# Patient Record
Sex: FEMALE | Race: WHITE | NOT HISPANIC OR LATINO | Employment: OTHER | URBAN - METROPOLITAN AREA
[De-identification: names, ages, dates, MRNs, and addresses within clinical notes are randomized per-mention and may not be internally consistent; named-entity substitution may affect disease eponyms.]

---

## 2019-06-03 ENCOUNTER — HOSPITAL ENCOUNTER (EMERGENCY)
Facility: HOSPITAL | Age: 74
Discharge: HOME | End: 2019-06-03
Attending: EMERGENCY MEDICINE
Payer: MEDICARE

## 2019-06-03 VITALS
TEMPERATURE: 98 F | WEIGHT: 130 LBS | RESPIRATION RATE: 20 BRPM | BODY MASS INDEX: 22.2 KG/M2 | HEART RATE: 63 BPM | DIASTOLIC BLOOD PRESSURE: 90 MMHG | HEIGHT: 64 IN | OXYGEN SATURATION: 99 % | SYSTOLIC BLOOD PRESSURE: 145 MMHG

## 2019-06-03 DIAGNOSIS — T14.90XA INJURY: ICD-10-CM

## 2019-06-03 DIAGNOSIS — S93.421A SPRAIN OF DELTOID LIGAMENT OF RIGHT ANKLE, INITIAL ENCOUNTER: ICD-10-CM

## 2019-06-03 DIAGNOSIS — H11.32 SUBCONJUNCTIVAL HEMORRHAGE OF LEFT EYE: ICD-10-CM

## 2019-06-03 DIAGNOSIS — T07.XXXA MULTIPLE CONTUSIONS: Primary | ICD-10-CM

## 2019-06-03 PROCEDURE — 73630 X-RAY EXAM OF FOOT: CPT | Mod: 26,RT,, | Performed by: RADIOLOGY

## 2019-06-03 PROCEDURE — 70450 CT HEAD WITHOUT CONTRAST: ICD-10-PCS | Mod: 26,,, | Performed by: RADIOLOGY

## 2019-06-03 PROCEDURE — 73110 XR WRIST COMPLETE 3 VIEWS LEFT: ICD-10-PCS | Mod: 26,LT,, | Performed by: RADIOLOGY

## 2019-06-03 PROCEDURE — 71046 X-RAY EXAM CHEST 2 VIEWS: CPT | Mod: 26,,, | Performed by: RADIOLOGY

## 2019-06-03 PROCEDURE — 73130 X-RAY EXAM OF HAND: CPT | Mod: 26,LT,, | Performed by: RADIOLOGY

## 2019-06-03 PROCEDURE — 73110 X-RAY EXAM OF WRIST: CPT | Mod: TC,FY,LT

## 2019-06-03 PROCEDURE — 73630 XR FOOT COMPLETE 3 VIEW RIGHT: ICD-10-PCS | Mod: 26,RT,, | Performed by: RADIOLOGY

## 2019-06-03 PROCEDURE — 70450 CT HEAD/BRAIN W/O DYE: CPT | Mod: 26,,, | Performed by: RADIOLOGY

## 2019-06-03 PROCEDURE — 70450 CT HEAD/BRAIN W/O DYE: CPT | Mod: TC

## 2019-06-03 PROCEDURE — 73110 X-RAY EXAM OF WRIST: CPT | Mod: 26,LT,, | Performed by: RADIOLOGY

## 2019-06-03 PROCEDURE — 73630 X-RAY EXAM OF FOOT: CPT | Mod: TC,FY,RT

## 2019-06-03 PROCEDURE — 73130 XR HAND COMPLETE 3 VIEW LEFT: ICD-10-PCS | Mod: 26,LT,, | Performed by: RADIOLOGY

## 2019-06-03 PROCEDURE — 25000003 PHARM REV CODE 250: Performed by: PHYSICIAN ASSISTANT

## 2019-06-03 PROCEDURE — 71046 XR CHEST PA AND LATERAL: ICD-10-PCS | Mod: 26,,, | Performed by: RADIOLOGY

## 2019-06-03 PROCEDURE — 99284 EMERGENCY DEPT VISIT MOD MDM: CPT | Mod: 25

## 2019-06-03 PROCEDURE — 73130 X-RAY EXAM OF HAND: CPT | Mod: TC,FY,LT

## 2019-06-03 PROCEDURE — 71046 X-RAY EXAM CHEST 2 VIEWS: CPT | Mod: TC,FY

## 2019-06-03 RX ORDER — HYDROCODONE BITARTRATE AND ACETAMINOPHEN 5; 325 MG/1; MG/1
1 TABLET ORAL EVERY 6 HOURS PRN
COMMUNITY

## 2019-06-03 RX ORDER — LATANOPROST 50 UG/ML
1 SOLUTION/ DROPS OPHTHALMIC NIGHTLY
COMMUNITY

## 2019-06-03 RX ORDER — HYDROCODONE BITARTRATE AND ACETAMINOPHEN 5; 325 MG/1; MG/1
0.5 TABLET ORAL
Status: COMPLETED | OUTPATIENT
Start: 2019-06-03 | End: 2019-06-03

## 2019-06-03 RX ADMIN — HYDROCODONE BITARTRATE AND ACETAMINOPHEN 0.5 TABLET: 5; 325 TABLET ORAL at 11:06

## 2019-06-03 NOTE — ED PROVIDER NOTES
Encounter Date: 6/3/2019       History     Chief Complaint   Patient presents with    Fall     hit face in the mud    Foot Pain    Facial Pain     Patient presents ER status post mechanical fall patient stated was walking when twisted her right ankle off of a curb stated fell forward and hit the left side of her face and wrist on the ground.  Patient denied any loss consciousness denied any nausea vomiting denied any change in vision denied any weakness. Patient describes pain to right foot and left hand as sharp nonradiating describes pain to left cheek as dull states took 2.5 mg of Norco right after the fall.  Patient denies any back pain or neck pain denies any abdominal pain or chest pain. Patient denies having ever happened to her in the past.    The history is provided by the patient.     Review of patient's allergies indicates:  No Known Allergies  Past Medical History:   Diagnosis Date    Arthritis     Chronic back pain     Diverticulosis     GERD (gastroesophageal reflux disease)     Pain management      Past Surgical History:   Procedure Laterality Date    BREAST RECONSTRUCTION Bilateral     BREAST SURGERY      COLON SURGERY      MASTECTOMY Bilateral      History reviewed. No pertinent family history.  Social History     Tobacco Use    Smoking status: Never Smoker   Substance Use Topics    Alcohol use: Yes     Comment: occ    Drug use: Never     Review of Systems   Constitutional: Negative for chills and fever.   HENT: Negative for ear pain.         Pain to left upper cheek and lateral orbit   Eyes: Positive for redness (Left). Negative for photophobia, pain, discharge, itching and visual disturbance.   Respiratory: Negative for cough, choking, chest tightness and shortness of breath.    Cardiovascular: Negative for chest pain, palpitations and leg swelling.   Gastrointestinal: Negative for constipation, diarrhea, nausea and vomiting.   Genitourinary: Negative for difficulty urinating.    Musculoskeletal: Negative for gait problem, neck pain and neck stiffness.   Skin: Negative for rash.   Neurological: Negative for dizziness, syncope, facial asymmetry, speech difficulty, weakness, light-headedness, numbness and headaches.   Psychiatric/Behavioral: Negative for confusion.   All other systems reviewed and are negative.      Physical Exam     Initial Vitals [06/03/19 1050]   BP Pulse Resp Temp SpO2   (!) 145/90 63 20 98.1 °F (36.7 °C) 99 %      MAP       --         Physical Exam    Nursing note and vitals reviewed.  Constitutional: She appears well-developed and well-nourished. She is not diaphoretic. No distress.   HENT:   Head: Atraumatic. Head is without raccoon's eyes and without Boudreaux's sign.       Right Ear: External ear normal.   Left Ear: External ear normal.   Nose: Nose normal.   Mouth/Throat: Oropharynx is clear and moist. No oropharyngeal exudate.   Eyes: Right eye exhibits no discharge. Left eye exhibits no discharge.   Neck: Normal range of motion.   Cardiovascular: Normal rate, regular rhythm, normal heart sounds and intact distal pulses. Exam reveals no gallop and no friction rub.    No murmur heard.  Pulmonary/Chest: Breath sounds normal. No respiratory distress. She has no wheezes. She has no rhonchi. She has no rales. She exhibits no tenderness.   Abdominal: Soft. There is no tenderness.   Musculoskeletal: Normal range of motion. She exhibits tenderness.        Hands:       Feet:    Neurological: She is alert and oriented to person, place, and time. She has normal strength. GCS score is 15. GCS eye subscore is 4. GCS verbal subscore is 5. GCS motor subscore is 6.   Strength 5/5 bilateral upper and lower extremity   Skin: Skin is warm and dry. Capillary refill takes less than 2 seconds.   Ecchymosis to left hand and wrist left lateral orbit and cheek right lateral dorsal surface of the foot   Psychiatric: She has a normal mood and affect. Thought content normal.         ED Course    Procedures  Labs Reviewed - No data to display       Imaging Results          CT Head Without Contrast (Final result)  Result time 06/03/19 12:27:28    Final result by Emeka Cooper MD (06/03/19 12:27:28)                 Impression:      1. Cortical atrophy with periventricular deep white matter change consistent with chronic small vessel ischemic disease.      Electronically signed by: Emeka Cooper  Date:    06/03/2019  Time:    12:27             Narrative:    EXAMINATION:  CT HEAD WITHOUT CONTRAST    CLINICAL HISTORY:  Maxface trauma blunt;    TECHNIQUE:  Low dose axial images were obtained through the head.  Coronal and sagittal reformations were also performed. Contrast was not administered.    COMPARISON:  None.    FINDINGS:  There is no acute hemorrhage or infarction.  There is cortical atrophy.  There are periventricular deep white matter changes consistent with chronic small vessel ischemic disease.    No extra-axial fluid collections.  Ventricles are normal in size, shape and configuration.  The basal cisterns are patent.    The imaged paranasal sinuses and ethmoid air cells are well aerated.    The mastoid air cells and middle ears are normally pneumatized.                               X-Ray Wrist Complete Left (Final result)  Result time 06/03/19 12:18:44    Final result by Emeka Cooper MD (06/03/19 12:18:44)                 Impression:      1. No acute radiographic findings of the left hand and left wrist.  2. Posttraumatic deformity of the distal radius.  3. Mild-to-moderate degenerative osteoarthrosis most prominent within the DIP joints of the 3rd through 5th digits.  4.   Chondrocalcinosis.  5. Bone demineralization.      Electronically signed by: Emeka Cooper  Date:    06/03/2019  Time:    12:18             Narrative:    EXAMINATION:  XR HAND COMPLETE 3 VIEW LEFT; XR WRIST COMPLETE 3 VIEWS LEFT    CLINICAL HISTORY:  injury;. Injury, unspecified, initial encounter    TECHNIQUE:  PA,  lateral, and oblique views of the left hand and wrist were performed.    COMPARISON:  None    FINDINGS:  No acute fracture or dislocation.  No significant soft tissue swelling.  There is posttraumatic deformity involving the distal metadiaphysis of the radius.  The distal ulna and ulnar styloid intact.  Mild ulnar positive variant.    Carpal bones are intact with mild degenerative osteoarthrosis.  Radiocarpal articulation is intact.  Subtle calcification of the triangular fibrocartilage is consistent with underlying chondrocalcinosis.    Mild to moderate degenerative osteoarthrosis involving the IP joints of the digits most predominant within the DIP joints of the 3rd through 5th digits.    There is bone demineralization.                               X-Ray Foot Complete Right (In process)                X-Ray Hand 3 view Left (Final result)  Result time 06/03/19 12:18:44    Final result by Emeka Cooper MD (06/03/19 12:18:44)                 Impression:      1. No acute radiographic findings of the left hand and left wrist.  2. Posttraumatic deformity of the distal radius.  3. Mild-to-moderate degenerative osteoarthrosis most prominent within the DIP joints of the 3rd through 5th digits.  4.   Chondrocalcinosis.  5. Bone demineralization.      Electronically signed by: Emeka Cooper  Date:    06/03/2019  Time:    12:18             Narrative:    EXAMINATION:  XR HAND COMPLETE 3 VIEW LEFT; XR WRIST COMPLETE 3 VIEWS LEFT    CLINICAL HISTORY:  injury;. Injury, unspecified, initial encounter    TECHNIQUE:  PA, lateral, and oblique views of the left hand and wrist were performed.    COMPARISON:  None    FINDINGS:  No acute fracture or dislocation.  No significant soft tissue swelling.  There is posttraumatic deformity involving the distal metadiaphysis of the radius.  The distal ulna and ulnar styloid intact.  Mild ulnar positive variant.    Carpal bones are intact with mild degenerative osteoarthrosis.   Radiocarpal articulation is intact.  Subtle calcification of the triangular fibrocartilage is consistent with underlying chondrocalcinosis.    Mild to moderate degenerative osteoarthrosis involving the IP joints of the digits most predominant within the DIP joints of the 3rd through 5th digits.    There is bone demineralization.                               X-Ray Chest PA And Lateral (Final result)  Result time 06/03/19 12:16:24    Final result by Emeka Cooper MD (06/03/19 12:16:24)                 Impression:      1. COPD with biapical pleural and parenchymal scarring.  2. Calcified right upper lobe granuloma.  3. Significant S shaped scoliosis.  4. Bone demineralization.      Electronically signed by: Emeka Cooper  Date:    06/03/2019  Time:    12:16             Narrative:    EXAMINATION:  XR CHEST PA AND LATERAL    CLINICAL HISTORY:  Injury, unspecified, initial encounter    TECHNIQUE:  PA and lateral views of the chest were performed.    COMPARISON:  None    FINDINGS:  Pulmonary hyperinflation with flattening diaphragms consistent with COPD.  Mild chronic interstitial change.  Biapical pleural and parenchymal scarring.  Calcified 5 mm granuloma the right upper lobe.  No focal consolidation.  Pulmonary vascularity is unremarkable.    Heart size is normal.  Mediastinal contours unremarkable.  Trachea midline.    Significant S shaped scoliosis of the thoracolumbar spine.  Bone demineralization.  Moderate degenerative thoracic spondylosis.                                 Medical Decision Making:   Differential Diagnosis:   Closed head injury contusion sprain fracture  Clinical Tests:   Radiological Study: Ordered and Reviewed  ED Management:  Discussed lab results with patient as well as imaging results discussed return to ER precautions with patient stated understood did not have any questions discussed at-home care with ice and over-the-counter medications for pain as well as contacting her primary pain   for further management of her pain patient stated understood did not have any questions                      Clinical Impression:       ICD-10-CM ICD-9-CM   1. Multiple contusions T07.XXXA 924.8   2. Injury T14.90XA 959.9   3. Sprain of deltoid ligament of right ankle, initial encounter S93.421A 845.01   4. Subconjunctival hemorrhage of left eye H11.32 372.72                                JOSEE Dewitt  06/03/19 1313

## 2019-06-03 NOTE — DISCHARGE INSTRUCTIONS
Ice 15 min 3 times a day for inflammation and swelling. Ace wrap as needed for swelling.  Follow up with pain management for further management of pain as needed.  Return to ER if you have any of the following symptoms:  Drooping of the face slurring of the speech difficulty thinking weakness to 1 side sudden change in vision.

## 2021-06-17 ENCOUNTER — OFFICE VISIT (OUTPATIENT)
Dept: PODIATRY | Facility: CLINIC | Age: 76
End: 2021-06-17
Payer: MEDICARE

## 2021-06-17 VITALS
HEIGHT: 64 IN | DIASTOLIC BLOOD PRESSURE: 80 MMHG | BODY MASS INDEX: 22.2 KG/M2 | HEART RATE: 67 BPM | WEIGHT: 130 LBS | SYSTOLIC BLOOD PRESSURE: 130 MMHG | TEMPERATURE: 99 F

## 2021-06-17 DIAGNOSIS — M20.12 VALGUS DEFORMITY OF BOTH GREAT TOES: ICD-10-CM

## 2021-06-17 DIAGNOSIS — L60.0 INGROWN NAIL: ICD-10-CM

## 2021-06-17 DIAGNOSIS — L03.031 PARONYCHIA OF GREAT TOE, RIGHT: Primary | ICD-10-CM

## 2021-06-17 DIAGNOSIS — M20.11 VALGUS DEFORMITY OF BOTH GREAT TOES: ICD-10-CM

## 2021-06-17 PROCEDURE — 99213 OFFICE O/P EST LOW 20 MIN: CPT | Mod: PBBFAC,PN | Performed by: PODIATRIST

## 2021-06-17 PROCEDURE — 99203 PR OFFICE/OUTPT VISIT, NEW, LEVL III, 30-44 MIN: ICD-10-PCS | Mod: S$PBB,,, | Performed by: PODIATRIST

## 2021-06-17 PROCEDURE — 99999 PR PBB SHADOW E&M-EST. PATIENT-LVL III: CPT | Mod: PBBFAC,,, | Performed by: PODIATRIST

## 2021-06-17 PROCEDURE — 99203 OFFICE O/P NEW LOW 30 MIN: CPT | Mod: S$PBB,,, | Performed by: PODIATRIST

## 2021-06-17 PROCEDURE — 99999 PR PBB SHADOW E&M-EST. PATIENT-LVL III: ICD-10-PCS | Mod: PBBFAC,,, | Performed by: PODIATRIST

## 2021-06-17 RX ORDER — DORZOLAMIDE HYDROCHLORIDE AND TIMOLOL MALEATE 20; 5 MG/ML; MG/ML
SOLUTION/ DROPS OPHTHALMIC
COMMUNITY
Start: 2021-06-14

## 2021-06-17 RX ORDER — ZOLPIDEM TARTRATE 10 MG/1
10 TABLET ORAL NIGHTLY PRN
COMMUNITY
Start: 2021-04-13

## 2021-06-17 RX ORDER — TIMOLOL MALEATE 5 MG/ML
1 SOLUTION/ DROPS OPHTHALMIC 2 TIMES DAILY
COMMUNITY
Start: 2021-05-14

## 2021-06-17 RX ORDER — ATORVASTATIN CALCIUM 40 MG/1
40 TABLET, FILM COATED ORAL DAILY
COMMUNITY
Start: 2021-05-29

## 2021-06-17 RX ORDER — FAMOTIDINE 40 MG/1
40 TABLET, FILM COATED ORAL DAILY
COMMUNITY
Start: 2021-03-29

## 2021-06-17 RX ORDER — OMEPRAZOLE 40 MG/1
40 CAPSULE, DELAYED RELEASE ORAL DAILY
COMMUNITY
Start: 2021-03-29

## 2021-06-19 PROBLEM — M20.12 VALGUS DEFORMITY OF BOTH GREAT TOES: Status: ACTIVE | Noted: 2021-06-19

## 2021-06-19 PROBLEM — M20.11 VALGUS DEFORMITY OF BOTH GREAT TOES: Status: ACTIVE | Noted: 2021-06-19

## 2021-06-19 PROBLEM — L03.031 PARONYCHIA OF GREAT TOE, RIGHT: Status: ACTIVE | Noted: 2021-06-19

## 2021-06-19 PROBLEM — L60.0 INGROWN NAIL: Status: ACTIVE | Noted: 2021-06-19

## 2025-03-13 ENCOUNTER — OFFICE VISIT (OUTPATIENT)
Dept: PODIATRY | Facility: CLINIC | Age: 80
End: 2025-03-13
Payer: MEDICARE

## 2025-03-13 VITALS
HEIGHT: 64 IN | WEIGHT: 130.06 LBS | SYSTOLIC BLOOD PRESSURE: 131 MMHG | DIASTOLIC BLOOD PRESSURE: 79 MMHG | HEART RATE: 71 BPM | BODY MASS INDEX: 22.2 KG/M2

## 2025-03-13 DIAGNOSIS — L60.0 INGROWN NAIL: Primary | ICD-10-CM

## 2025-03-13 DIAGNOSIS — M20.11 VALGUS DEFORMITY OF BOTH GREAT TOES: ICD-10-CM

## 2025-03-13 DIAGNOSIS — L97.511 ULCER OF RIGHT FOOT, LIMITED TO BREAKDOWN OF SKIN: ICD-10-CM

## 2025-03-13 DIAGNOSIS — M20.12 VALGUS DEFORMITY OF BOTH GREAT TOES: ICD-10-CM

## 2025-03-13 DIAGNOSIS — L03.031 PARONYCHIA OF GREAT TOE, RIGHT: ICD-10-CM

## 2025-03-13 PROCEDURE — 99203 OFFICE O/P NEW LOW 30 MIN: CPT | Mod: S$PBB,,, | Performed by: PODIATRIST

## 2025-03-13 PROCEDURE — 99204 OFFICE O/P NEW MOD 45 MIN: CPT | Mod: PBBFAC,PN | Performed by: PODIATRIST

## 2025-03-13 PROCEDURE — 99999 PR PBB SHADOW E&M-NEW PATIENT-LVL IV: CPT | Mod: PBBFAC,,, | Performed by: PODIATRIST

## 2025-03-13 RX ORDER — ASPIRIN 81 MG/1
81 TABLET ORAL
COMMUNITY
Start: 2024-09-11

## 2025-03-13 RX ORDER — TIZANIDINE 4 MG/1
4 TABLET ORAL 3 TIMES DAILY
COMMUNITY
Start: 2025-01-07

## 2025-03-13 RX ORDER — ATORVASTATIN CALCIUM 40 MG/1
TABLET, FILM COATED ORAL
COMMUNITY
Start: 2024-08-20

## 2025-03-13 RX ORDER — TIZANIDINE HYDROCHLORIDE 4 MG/1
4 CAPSULE, GELATIN COATED ORAL
COMMUNITY
Start: 2025-02-20

## 2025-03-16 PROBLEM — L97.511 ULCER OF RIGHT FOOT, LIMITED TO BREAKDOWN OF SKIN: Status: ACTIVE | Noted: 2025-03-16

## 2025-03-16 NOTE — PROGRESS NOTES
Subjective:       Patient ID: Erum Pepe is a 79 y.o. female.    Chief Complaint: Foot Pain   Patient presents today for new patient evaluation she is complaining about a painful area on the side of her right foot she states this has been present about 20 years but it has been bothering her more recently she states she frequently has to trim the area and smooth that down which is good for a couple of months until it builds back up.  Patient has had a hip replacement on the right side which has left her 3/4 of an inch shorter on the right than the left she is using an over-the-counter lift at this time.  I last saw the patient over 3 years ago.  Patient has a bunions and tailor's bunions bilateral she states she has learned to deal with this and is careful about the types of shoes that she wears.    Past Medical History:   Diagnosis Date    Arthritis     Chronic back pain     Diverticulosis     GERD (gastroesophageal reflux disease)     Pain management      Past Surgical History:   Procedure Laterality Date    BREAST RECONSTRUCTION Bilateral     BREAST SURGERY      COLON SURGERY      MASTECTOMY Bilateral      No family history on file.  Social History     Socioeconomic History    Marital status:    Tobacco Use    Smoking status: Never   Substance and Sexual Activity    Alcohol use: Yes     Comment: occ    Drug use: Never    Sexual activity: Not Currently       Current Outpatient Medications   Medication Sig Dispense Refill    aspirin (ADULT LOW DOSE ASPIRIN) 81 MG EC tablet 81 mg.      atorvastatin (LIPITOR) 40 MG tablet Take 40 mg by mouth once daily.      atorvastatin (LIPITOR) 40 MG tablet = 1 tab, Oral, Daily, # 90 tab, 3 Refill(s), Pharmacy: [id number], 162, cm, 08/20/24 9:20:00 CDT, Height/Length Measured, 54, kg, 08/20/24 9:20:00 CDT, Weight Dosing      chlorpheniramine-pseudoephedrine-acetaminophen (SINUTAB) 2- mg per tablet 1,000 mg.      dorzolamide-timolol 2-0.5% (COSOPT)  "22.3-6.8 mg/mL ophthalmic solution       famotidine (PEPCID) 40 MG tablet Take 40 mg by mouth once daily.      HYDROcodone-acetaminophen (NORCO) 5-325 mg per tablet Take 1 tablet by mouth every 6 (six) hours as needed for Pain.      latanoprost 0.005 % dpem 1 drop.      latanoprost 0.005 % ophthalmic solution 1 drop every evening.      omeprazole (PRILOSEC) 40 MG capsule Take 40 mg by mouth once daily.      omeprazole magnesium (PRILOSEC ORAL) Take by mouth.      ranitidine (ZANTAC) 300 MG tablet Take 300 mg by mouth every evening.      timolol (BETIMOL) 0.5 % ophthalmic solution 1 drop 2 (two) times daily.      timolol maleate 0.5% (TIMOPTIC) 0.5 % Drop Place 1 drop into both eyes 2 (two) times daily.      tiZANidine (ZANAFLEX) 4 MG tablet Take 4 mg by mouth 3 (three) times daily.      tiZANidine 4 mg Cap 4 mg.      zolpidem (AMBIEN) 10 mg Tab Take 10 mg by mouth nightly as needed.       No current facility-administered medications for this visit.     Review of patient's allergies indicates:  No Known Allergies    Review of Systems   Musculoskeletal:  Positive for arthralgias.   Skin:  Positive for color change and wound.   All other systems reviewed and are negative.      Objective:      Vitals:    03/13/25 1523   BP: 131/79   Pulse: 71   Weight: 59 kg (130 lb 1.1 oz)   Height: 5' 4" (1.626 m)     Physical Exam  Vitals and nursing note reviewed.   Constitutional:       Appearance: Normal appearance.   Cardiovascular:      Pulses:           Dorsalis pedis pulses are 1+ on the right side and 1+ on the left side.        Posterior tibial pulses are 1+ on the right side and 1+ on the left side.   Pulmonary:      Effort: Pulmonary effort is normal.   Musculoskeletal:         General: Tenderness and deformity present.      Right foot: Decreased range of motion. Deformity and bunion present.      Left foot: Deformity and bunion present.        Feet:    Feet:      Right foot:      Protective Sensation: 2 sites tested.  2 " sites sensed.      Skin integrity: Ulcer, erythema, warmth, callus and dry skin present.      Toenail Condition: Right toenails are abnormally thick. Fungal disease present.     Left foot:      Protective Sensation: 2 sites tested.  2 sites sensed.      Skin integrity: Dry skin present.      Toenail Condition: Left toenails are abnormally thick. Fungal disease present.  Skin:     General: Skin is warm.      Capillary Refill: Capillary refill takes 2 to 3 seconds.      Findings: Erythema present.   Neurological:      General: No focal deficit present.      Mental Status: She is alert.   Psychiatric:         Mood and Affect: Mood normal.         Behavior: Behavior normal.         Thought Content: Thought content normal.         Judgment: Judgment normal.                                    Assessment:       1. Ingrown nail    2. Paronychia of great toe, right    3. Valgus deformity of both great toes    4. Ulcer of right foot, limited to breakdown of skin        Plan:        Patient presents today for new patient evaluation she is complaining about a painful area on the side of her right foot she states this has been present about 20 years but it has been bothering her more recently she states she frequently has to trim the area and smooth that down which is good for a couple of months until it builds back up.  Patient has had a hip replacement on the right side which has left her 3/4 of an inch shorter on the right than the left she is using an over-the-counter lift at this time.  I last saw the patient over 3 years ago.  Patient has a bunions and tailor's bunions bilateral she states she has learned to deal with this and is careful about the types of shoes that she wears.  A comprehensive new patient evaluation was performed today patient is using an over-the-counter gel lift in her right shoe I did dispense the patient a doubled up PPT blue heel lift I advised her I want her to try this and see how she does with  this I advised her we can always go with something more aggressive more firm this is simply a starting point I have given her extra heel lifts so she can put these in other shoes as tolerated.  Patient may need some power step arch supports with the heel lift but ultimately I want see how she does with the blue heel lift today dispensed her she had asked me about the power step arch supports.  I was able to carefully non excisionally debride the hyperkeratotic lesion overlying the styloid process right which is very prominent the patient frequently builds up a pre ulcerative area here.  After I debrided this I have recommended the application of Kerralite cool topical gel layer and then she can put a Band-Aid over this to prevent rubbing irritation this will also keep the area well moisturized to prevent buildup of callus tissue.  Patient states she is going to look into possibly getting some SAS shoes something that fits her foot much better I do recommend re-evaluating her in a few weeks to see how she does with the heel lifts so we can make adjustments also re-evaluate the styloid process right she is going to restart doing the Vicks vapor rub twice a day every day to combat the fungus in the toenails.  This note was created using [employer] voice recognition software that occasionally misinterpreted phrases or words.

## 2025-03-27 ENCOUNTER — OFFICE VISIT (OUTPATIENT)
Dept: PODIATRY | Facility: CLINIC | Age: 80
End: 2025-03-27
Payer: MEDICARE

## 2025-03-27 VITALS
DIASTOLIC BLOOD PRESSURE: 77 MMHG | SYSTOLIC BLOOD PRESSURE: 134 MMHG | WEIGHT: 130.06 LBS | BODY MASS INDEX: 22.2 KG/M2 | HEART RATE: 72 BPM | HEIGHT: 64 IN

## 2025-03-27 DIAGNOSIS — L97.511 ULCER OF RIGHT FOOT, LIMITED TO BREAKDOWN OF SKIN: ICD-10-CM

## 2025-03-27 DIAGNOSIS — M21.70 ACQUIRED UNEQUAL LIMB LENGTH: Primary | ICD-10-CM

## 2025-03-27 PROCEDURE — 99213 OFFICE O/P EST LOW 20 MIN: CPT | Mod: S$PBB,,, | Performed by: PODIATRIST

## 2025-03-27 PROCEDURE — 99214 OFFICE O/P EST MOD 30 MIN: CPT | Mod: PBBFAC,PN | Performed by: PODIATRIST

## 2025-03-27 PROCEDURE — 99999 PR PBB SHADOW E&M-EST. PATIENT-LVL IV: CPT | Mod: PBBFAC,,, | Performed by: PODIATRIST

## 2025-03-30 PROBLEM — M21.70 ACQUIRED UNEQUAL LIMB LENGTH: Status: ACTIVE | Noted: 2025-03-30

## 2025-03-30 NOTE — PROGRESS NOTES
Subjective:       Patient ID: Erum Pepe is a 79 y.o. female.    Chief Complaint: Ingrown Toenail   Patient presents for follow-up of a pre ulcerative area overlying the styloid process right and limb length discrepancy right following a hip replacement.    Past Medical History:   Diagnosis Date    Arthritis     Chronic back pain     Diverticulosis     GERD (gastroesophageal reflux disease)     Pain management      Past Surgical History:   Procedure Laterality Date    BREAST RECONSTRUCTION Bilateral     BREAST SURGERY      COLON SURGERY      MASTECTOMY Bilateral      No family history on file.  Social History     Socioeconomic History    Marital status:    Tobacco Use    Smoking status: Never   Substance and Sexual Activity    Alcohol use: Yes     Comment: occ    Drug use: Never    Sexual activity: Not Currently       Current Outpatient Medications   Medication Sig Dispense Refill    aspirin (ADULT LOW DOSE ASPIRIN) 81 MG EC tablet 81 mg.      atorvastatin (LIPITOR) 40 MG tablet = 1 tab, Oral, Daily, # 90 tab, 3 Refill(s), Pharmacy: [facility] #[id number], 162, cm, 08/20/24 9:20:00 CDT, Height/Length Measured, 54, kg, 08/20/24 9:20:00 CDT, Weight Dosing      chlorpheniramine-pseudoephedrine-acetaminophen (SINUTAB) 2- mg per tablet 1,000 mg.      dorzolamide-timolol 2-0.5% (COSOPT) 22.3-6.8 mg/mL ophthalmic solution       famotidine (PEPCID) 40 MG tablet Take 40 mg by mouth once daily.      HYDROcodone-acetaminophen (NORCO) 5-325 mg per tablet Take 1 tablet by mouth every 6 (six) hours as needed for Pain.      latanoprost 0.005 % ophthalmic solution 1 drop every evening.      omeprazole (PRILOSEC) 40 MG capsule Take 40 mg by mouth once daily.      omeprazole magnesium (PRILOSEC ORAL) Take by mouth.      ranitidine (ZANTAC) 300 MG tablet Take 300 mg by mouth every evening.      timolol (BETIMOL) 0.5 % ophthalmic solution 1 drop 2 (two) times daily.      timolol maleate 0.5% (TIMOPTIC) 0.5 % Drop  "Place 1 drop into both eyes 2 (two) times daily.      tiZANidine (ZANAFLEX) 4 MG tablet Take 4 mg by mouth 3 (three) times daily.      zolpidem (AMBIEN) 10 mg Tab Take 10 mg by mouth nightly as needed.       No current facility-administered medications for this visit.     Review of patient's allergies indicates:  No Known Allergies    Review of Systems   Musculoskeletal:  Positive for arthralgias.   Skin:  Positive for color change and wound.   All other systems reviewed and are negative.      Objective:      Vitals:    03/27/25 1036   BP: 134/77   Pulse: 72   Weight: 59 kg (130 lb 1.1 oz)   Height: 5' 4" (1.626 m)     Physical Exam  Vitals and nursing note reviewed.   Constitutional:       Appearance: Normal appearance.   Cardiovascular:      Pulses:           Dorsalis pedis pulses are 1+ on the right side and 1+ on the left side.        Posterior tibial pulses are 1+ on the right side and 1+ on the left side.   Pulmonary:      Effort: Pulmonary effort is normal.   Musculoskeletal:         General: Tenderness and deformity present.      Right foot: Decreased range of motion. Deformity and bunion present.      Left foot: Deformity and bunion present.        Feet:    Feet:      Right foot:      Protective Sensation: 2 sites tested.  2 sites sensed.      Skin integrity: Ulcer, erythema, warmth, callus and dry skin present.      Toenail Condition: Right toenails are abnormally thick. Fungal disease present.     Left foot:      Protective Sensation: 2 sites tested.  2 sites sensed.      Skin integrity: Dry skin present.      Toenail Condition: Left toenails are abnormally thick. Fungal disease present.  Skin:     General: Skin is warm.      Capillary Refill: Capillary refill takes 2 to 3 seconds.      Findings: Erythema present.   Neurological:      General: No focal deficit present.      Mental Status: She is alert.   Psychiatric:         Mood and Affect: Mood normal.         Behavior: Behavior normal.         Thought " Content: Thought content normal.         Judgment: Judgment normal.                                                  Assessment:       1. Acquired unequal limb length    2. Ulcer of right foot, limited to breakdown of skin        Plan:       Patient presents for follow-up of a pre ulcerative area overlying the styloid process right and limb length discrepancy right following a hip replacement.  Patient states the area that she had the pre ulcerative callus buildup overlying the styloid process right is completely pain-free it is doing great she did use the Kerralite cool to pad and hydrate the area but has since discontinued this because the rubbing has stopped she did purchase some new SAS shoes which she states are very comfortable in her plenty wide they do not rub on this area.  Patient states the double heel lift that I gave her made out of soft PPT has definitely been helpful but she needs a little bit more lift on the right side there is a 3/4 of an inch difference between her right and her left with the right being shorter.  I did fabricate the patient a new style heel lift using core Aks quarter-inch and then a quarter-inch PPT lift over the top of this I advised the patient I want her to try this see how she does with it we can always add more lift as needed patient was in understanding and agreement she states she will try this and will make adjustments as necessary.  Follow-up as needed.  This note was created using [employer] voice recognition software that occasionally misinterpreted phrases or words.